# Patient Record
Sex: FEMALE | URBAN - METROPOLITAN AREA
[De-identification: names, ages, dates, MRNs, and addresses within clinical notes are randomized per-mention and may not be internally consistent; named-entity substitution may affect disease eponyms.]

---

## 2021-07-14 ENCOUNTER — OFFICE VISIT (OUTPATIENT)
Dept: URBAN - METROPOLITAN AREA CLINIC 92 | Facility: CLINIC | Age: 36
End: 2021-07-14
Payer: COMMERCIAL

## 2021-07-14 DIAGNOSIS — H52.223 REGULAR ASTIGMATISM, BILATERAL: Primary | ICD-10-CM

## 2021-07-14 DIAGNOSIS — H40.053 OCULAR HYPERTENSION, BILATERAL: ICD-10-CM

## 2021-07-14 PROCEDURE — 92004 COMPRE OPH EXAM NEW PT 1/>: CPT | Performed by: OPTOMETRIST

## 2021-07-14 ASSESSMENT — VISUAL ACUITY
OS: 20/20
OD: 20/20

## 2021-07-14 ASSESSMENT — KERATOMETRY
OS: 44.25
OD: 44.50

## 2021-07-14 ASSESSMENT — INTRAOCULAR PRESSURE
OS: 26
OS: 25
OD: 25
OD: 24
OS: 24

## 2021-07-14 NOTE — IMPRESSION/PLAN
Impression: Ocular hypertension, bilateral: H40.053. Plan: IOP historically elevated OU. Doing better today, but still slightly elevated. No changes to Eulalio Mcconnell appearance or vision changes. Continue to monitor.

## 2021-07-14 NOTE — IMPRESSION/PLAN
Impression: Regular astigmatism, bilateral: H52.223. Plan: Discussed diagnosis with patient. Dispensed new Spec Rx today for full time wear. CL not updated at this appointment. Patient educated to use specs and d/c use of old CL until updates. Patient educated on importance of proper CL use to reduce infection.